# Patient Record
Sex: FEMALE | Race: WHITE | NOT HISPANIC OR LATINO | Employment: STUDENT | URBAN - METROPOLITAN AREA
[De-identification: names, ages, dates, MRNs, and addresses within clinical notes are randomized per-mention and may not be internally consistent; named-entity substitution may affect disease eponyms.]

---
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
## 2021-05-24 ENCOUNTER — MEDICAL CORRESPONDENCE (OUTPATIENT)
Dept: HEALTH INFORMATION MANAGEMENT | Facility: CLINIC | Age: 21
End: 2021-05-24

## 2021-06-09 ENCOUNTER — HOSPITAL ENCOUNTER (OUTPATIENT)
Dept: PHYSICAL THERAPY | Facility: CLINIC | Age: 21
Setting detail: THERAPIES SERIES
End: 2021-06-09
Attending: ORTHOPAEDIC SURGERY
Payer: COMMERCIAL

## 2021-06-09 PROCEDURE — 97033 APP MDLTY 1+IONTPHRSIS EA 15: CPT | Mod: GP | Performed by: PHYSICAL THERAPIST

## 2021-06-09 PROCEDURE — 97162 PT EVAL MOD COMPLEX 30 MIN: CPT | Mod: GP | Performed by: PHYSICAL THERAPIST

## 2021-06-09 PROCEDURE — 97110 THERAPEUTIC EXERCISES: CPT | Mod: GP | Performed by: PHYSICAL THERAPIST

## 2021-06-09 NOTE — PROGRESS NOTES
06/09/21 1100   General Information   Type of Visit Initial OP Ortho PT Evaluation   Start of Care Date 06/09/21   Referring Physician Rah Vance    Patient/Family Goals Statement Eat normal food.    Orders Evaluate and Treat   Orders Comment If no improvement in 4 visits, return to TMJ clinic.    Date of Order 05/24/21   Certification Required? No  (BCBS )   Medical Diagnosis B TMJ Hypermobility   Surgical/Medical history reviewed   (Anxiety, Depr, R Shoulder Reconstructive Surgry 2015)   Precautions/Limitations no known precautions/limitations  (Soft diet, decrease clenching. )   Special Instructions Soft food diet since 5/25/21, with some relief.    General Information Comments Adviil and Tylenol as needed. Sees TMJ specialist in July again.    Body Part(s)   Body Part(s) TMJ   Presentation and Etiology   Pertinent history of current problem (include personal factors and/or comorbidities that impact the POC) Has had clicking since a kid, had dental appt a year ago, and discussed it, saw dentist again in April to get order to pursue TMJ evaluation. Was referred to PT.    Impairments A. Pain;M. Locking or catching   Functional Limitations Other  (Oral Functions )   Symptom Location Pain R TMJ area.    How/Where did it occur From insidious onset   Onset date of current episode/exacerbation 05/24/21  (MD visit, but chronic. )   Chronicity Chronic   Pain rating (0-10 point scale)   (1-7/10 )   Pain quality A. Sharp;C. Aching   Frequency of pain/symptoms A. Constant   Pain/symptoms are: The same all the time   Pain/symptoms exacerbated by M. Other   Pain exacerbation comment Eating    Pain/symptoms eased by H. Cold;I. OTC medication(s)   Progression of symptoms since onset: Improved  (w/change in diet. )   Current / Previous Interventions   Diagnostic Tests: X-ray   X-ray Results Results   X-ray results Unavailable to PT.    Prior Level of Function   Functional Level Prior Comment Independent Currently    Current  Level of Function   Current Community Support Family/friend caregiver   Patient role/employment history A. Employed;B. Student   Employment Comments [employer]. In college for criminal justice.    Living environment [place]   Fall Risk Screen   Fall screen completed by PT   Have you fallen 2 or more times in the past year? No   Have you fallen and had an injury in the past year? No   Timed Up and Go score (seconds) No balance issues and walks quickly into dept.    Is patient a fall risk? No   System Outcome Measures   Outcome Measures   (TMJ Functional Scale Scanned into chart., )   Functional Scales   Functional Scales OPTIMAL   Optimal (1=able to do without difficulty, 2=able to do with little difficulty, 3=able to do with moderate difficulty, 4=able to do with much difficulty, 5=unable to do, 9=NA) Activity 1;Activity 2;Activity 3;Difficulty Score   Activity 1 comment Chewing chicken 7/10    Activity 2 comment Chewing hard bread or tougher food 10/10    Activity 3 comment Yawning 6/10    Difficult score comment 10= SEvere Limitation    TMJ Objective Findings   Integumentary  Equal facial features.    Posture Rounded shoulders.    Joint noises Yes without pain  (WAs getting clicking at end of opening, slips, no pain. )   Joint lock   (After wisdom teeth, could not open or close. 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    Difficulty swallowing No   Muscal Ridging Yes   Tongue Scalloping Yes   Accelerated Tooth Wear No   Overjet (mm) 2   Overbite (mm) 2   Intraoral mouth appliance No  (Might be getting bite guard this summer, )   Bite/Occlusion Bite does not feel off   Major dental work   ([place] teeth within last 2-3 years. )   Tired or painful jaw muscles Yes   Opening click No   Closing click Yes   Crepitus No   Subluxation No   Early translation No   TMJ ROM  Mandible Opening;Mandible Protrusion;Left Laterotrusion;Right Laterotrusion;Deviation with Opening   Palpation Lateral capsule;Superior capsule  (Maseter tender B. )   Accessory Motion Rocabado (0-9)  (9/9 on Rocabado Scale. )   Mandible Opening 35   Mandible Protrusion 7   Left Laterotrusion 12   Right Laterotrusion 12   Deviation with Opening Deviates L during closing.    Lateral capsule Slightly tender    Superior Capsule slightly tender    Planned Therapy Interventions   Planned Therapy Interventions Comment MT, STM, Jt mobility, Retraining, Develop HEP.    Planned Modality Interventions   Planned Modality Interventions Comments Iontophoresis, US,    Clinical Impression   Criteria for Skilled Therapeutic Interventions Met yes, treatment indicated   PT Diagnosis Pain, Hypermobility of TMJ jt.    Influenced by the following impairments Pain, Catching joint.    Functional limitations due to impairments Eating, Yawning,    Clinical Presentation Evolving/Changing   Clinical Presentation Rationale TMJ Functional Scale, TMJ ROM, High Rocabado Score, Mental Health Issues.    Clinical Decision Making (Complexity) Moderate complexity   Therapy Frequency 2 times/Week   Predicted Duration of Therapy Intervention (days/wks) 6 weeks decreasing as appropriate. 10 visits.    Risk & Benefits of therapy have been explained Yes   Patient, Family & other staff in agreement with plan of care Yes   Clinical Impression Comments Pain, Hypermobility of TMJ jt.    Education Assessment   Preferred Learning Style Listening;Demonstration;Pictures/video   Barriers to Learning No barriers   ORTHO GOALS   PT Ortho Eval Goals 1;2;3;4   Ortho Goal 1   Goal Identifier 1   Goal Description STG: Pt will be able to chew bread 4/10 on functional scale.    Target Date 07/22/21   Ortho Goal 2   Goal Identifier 2   Goal Description STG:Pt will be able to open enough to bite into an apple 5/10 on functional scale.    Target Date 07/22/21   Ortho Goal 3    Goal Identifier 3   Goal Description STG: Pt will be able to yawn 3/10 on funcitonal scale.    Target Date 07/22/21   Ortho Goal 4   Goal Identifier 4   Goal Description LTG: Pt will be independent w/HEP and self cares to manage TMJ sx's and problems.    Target Date 07/22/21   Total Evaluation Time   PT Eval, Moderate Complexity Minutes (13704) 35   Maxine Huerta, PT, [facility] (#[id number])  [facility]           [phone]  Fax          [phone]  Appt #      [phone]

## 2021-06-16 ENCOUNTER — HOSPITAL ENCOUNTER (OUTPATIENT)
Dept: PHYSICAL THERAPY | Facility: CLINIC | Age: 21
Setting detail: THERAPIES SERIES
End: 2021-06-16
Attending: ORTHOPAEDIC SURGERY
Payer: COMMERCIAL

## 2021-06-16 PROCEDURE — 97110 THERAPEUTIC EXERCISES: CPT | Mod: GP | Performed by: PHYSICAL THERAPIST

## 2021-06-16 PROCEDURE — 97033 APP MDLTY 1+IONTPHRSIS EA 15: CPT | Mod: GP | Performed by: PHYSICAL THERAPIST

## 2021-06-16 PROCEDURE — 97140 MANUAL THERAPY 1/> REGIONS: CPT | Mod: GP | Performed by: PHYSICAL THERAPIST

## 2021-06-21 ENCOUNTER — HOSPITAL ENCOUNTER (OUTPATIENT)
Dept: PHYSICAL THERAPY | Facility: CLINIC | Age: 21
Setting detail: THERAPIES SERIES
End: 2021-06-21
Attending: ORTHOPAEDIC SURGERY
Payer: COMMERCIAL

## 2021-06-21 PROCEDURE — 97033 APP MDLTY 1+IONTPHRSIS EA 15: CPT | Mod: GP | Performed by: PHYSICAL THERAPIST

## 2021-06-21 PROCEDURE — 97140 MANUAL THERAPY 1/> REGIONS: CPT | Mod: GP | Performed by: PHYSICAL THERAPIST

## 2021-06-23 ENCOUNTER — HOSPITAL ENCOUNTER (OUTPATIENT)
Dept: PHYSICAL THERAPY | Facility: CLINIC | Age: 21
Setting detail: THERAPIES SERIES
End: 2021-06-23
Attending: ORTHOPAEDIC SURGERY
Payer: COMMERCIAL

## 2021-06-23 PROCEDURE — 97033 APP MDLTY 1+IONTPHRSIS EA 15: CPT | Mod: GP | Performed by: PHYSICAL THERAPIST

## 2021-06-23 PROCEDURE — 97140 MANUAL THERAPY 1/> REGIONS: CPT | Mod: GP | Performed by: PHYSICAL THERAPIST

## 2021-06-28 ENCOUNTER — HOSPITAL ENCOUNTER (OUTPATIENT)
Dept: PHYSICAL THERAPY | Facility: CLINIC | Age: 21
Setting detail: THERAPIES SERIES
End: 2021-06-28
Attending: ORTHOPAEDIC SURGERY
Payer: COMMERCIAL

## 2021-06-28 PROCEDURE — 97140 MANUAL THERAPY 1/> REGIONS: CPT | Mod: GP | Performed by: PHYSICAL THERAPIST

## 2021-06-28 PROCEDURE — 97033 APP MDLTY 1+IONTPHRSIS EA 15: CPT | Mod: GP | Performed by: PHYSICAL THERAPIST

## 2021-07-14 ENCOUNTER — HOSPITAL ENCOUNTER (OUTPATIENT)
Dept: PHYSICAL THERAPY | Facility: CLINIC | Age: 21
Setting detail: THERAPIES SERIES
End: 2021-07-14
Attending: ORTHOPAEDIC SURGERY
Payer: COMMERCIAL

## 2021-07-14 PROCEDURE — 97033 APP MDLTY 1+IONTPHRSIS EA 15: CPT | Mod: GP | Performed by: PHYSICAL THERAPIST

## 2021-07-14 PROCEDURE — 97140 MANUAL THERAPY 1/> REGIONS: CPT | Mod: GP | Performed by: PHYSICAL THERAPIST

## 2021-07-21 ENCOUNTER — HOSPITAL ENCOUNTER (OUTPATIENT)
Dept: PHYSICAL THERAPY | Facility: CLINIC | Age: 21
Setting detail: THERAPIES SERIES
End: 2021-07-21
Attending: ORTHOPAEDIC SURGERY
Payer: COMMERCIAL

## 2021-07-21 PROCEDURE — 97035 APP MDLTY 1+ULTRASOUND EA 15: CPT | Mod: GP | Performed by: PHYSICAL THERAPIST

## 2021-07-21 PROCEDURE — 97140 MANUAL THERAPY 1/> REGIONS: CPT | Mod: GP | Performed by: PHYSICAL THERAPIST

## 2021-07-21 NOTE — PROGRESS NOTES
Outpatient Physical Therapy Progress Note     Patient: Danuta Mcclendon  : 2000    Beginning/End Dates of Reporting Period:  21 to 21. Total # of Rx sessions: 7    Referring Provider: Rah Vance Diagnosis: B TMJ Hypermobility.      Client Self Report: MD said to continue PT and start eating a little bit tougher food. Pain Scale: 1/10 when taking it easy.  Chewed steak last weekend and it flared her up.     Objective Measurements:  Objective Measure: TMJ Functional Scale   Details: 21  Scanned into epic.  INITIALLY:  Scanned into epic.     Objective Measure: TMJ ROM  Details: 21  Opening 39, Laterotrusion 10 mm each.  21 Opening 35, Laterotrusion 10 B, Protrusion 9 mm.  INITIALLY:  Opening 35, Protrusioin 7, B Laterotusion 12 mm, Deviates L during closing.     Objective Measure: Spec Tests  Details: 21  No tenderness of masseters today.  INITIALLY:  Rocabado /; Tender B Masseter, Overjet and Overbite 2 mm.      Goals:  Goal Identifier 1   Goal Description STG: Pt will be able to chew bread 4/10 on functional scale. 21 NOT MET    Target Date 21   Date Met      Progress (detail required for progress note):  Today rated 9/10 for chewing hard bread.      Goal Identifier 2   Goal Description STG:Pt will be able to open enough to bite into an apple 5/10 on functional scale.  21 Apple 7/10 and [place] 6/10.  NOT MET    Target Date 21   Date Met      Progress (detail required for progress note):  21 Apple 7/10 and [place] 6/10.     Goal Identifier 3   Goal Description STG: Pt will be able to yawn 3/10 on funcitonal scale.   21  Rated 4/10    Target Date 21   Date Met      Progress (detail required for progress note): Better, but rated 4/10, not 3/10.      Goal Identifier 4   Goal Description LTG: Pt will be independent w/HEP and self cares to manage TMJ sx's and problems.    Target Date 21   Date Met      Progress (detail  required for progress note):     Plan:  Continue therapy per current plan of care.  Changes to therapy plan of care: US vs. Iontophoresis.     Discharge:  No    Maxine Huerta PT, [facility] (#[id number])  [facility]           [phone]  Fax          [phone]  Appt #      [phone]

## 2021-08-02 ENCOUNTER — HOSPITAL ENCOUNTER (OUTPATIENT)
Dept: PHYSICAL THERAPY | Facility: CLINIC | Age: 21
Setting detail: THERAPIES SERIES
End: 2021-08-02
Attending: ORTHOPAEDIC SURGERY
Payer: COMMERCIAL

## 2021-08-02 PROCEDURE — 97035 APP MDLTY 1+ULTRASOUND EA 15: CPT | Mod: GP | Performed by: PHYSICAL THERAPIST

## 2021-08-02 PROCEDURE — 97140 MANUAL THERAPY 1/> REGIONS: CPT | Mod: GP | Performed by: PHYSICAL THERAPIST

## 2021-08-11 ENCOUNTER — HOSPITAL ENCOUNTER (OUTPATIENT)
Dept: PHYSICAL THERAPY | Facility: CLINIC | Age: 21
Setting detail: THERAPIES SERIES
End: 2021-08-11
Attending: ORTHOPAEDIC SURGERY
Payer: COMMERCIAL

## 2021-08-11 PROCEDURE — 97140 MANUAL THERAPY 1/> REGIONS: CPT | Mod: GP | Performed by: PHYSICAL THERAPIST

## 2021-08-11 PROCEDURE — 97035 APP MDLTY 1+ULTRASOUND EA 15: CPT | Mod: GP | Performed by: PHYSICAL THERAPIST

## 2021-08-16 ENCOUNTER — HOSPITAL ENCOUNTER (OUTPATIENT)
Dept: PHYSICAL THERAPY | Facility: CLINIC | Age: 21
Setting detail: THERAPIES SERIES
End: 2021-08-16
Attending: ORTHOPAEDIC SURGERY
Payer: COMMERCIAL

## 2021-08-16 PROCEDURE — 97035 APP MDLTY 1+ULTRASOUND EA 15: CPT | Mod: GP | Performed by: PHYSICAL THERAPIST

## 2021-08-16 PROCEDURE — 97140 MANUAL THERAPY 1/> REGIONS: CPT | Mod: GP | Performed by: PHYSICAL THERAPIST

## 2021-08-16 NOTE — PROGRESS NOTES
Outpatient Physical Therapy Discharge Note     Patient: Danuta Mcclendon  : 2000    Beginning/End Dates of Reporting Period:  21 to 21. Total # of Rx sessions: 10    Referring Provider: Rah Vance Diagnosis:  B TMJ Hypermobility      Client Self Report: Last appointment. Returning to college. Tried eating tougher food and it seemed to flare it up. Pain Scale: 4/10 average. Get clicking w/ yawning wide.     Objective Measurements:  Objective Measure: TMJ Functional Scale   Details: 21  Scanned into epic.  INITIALLY:  Scanned into epic.     Objective Measure: TMJ ROM  Details: 21  Opening 41, Laterotrusio  10 mm.  21  Opening 39, Laterotrusion 10 mm each.  21 Opening 35, Laterotrusion 10 B, Protrusion 9 mm.  INITIALLY:  Opening 35, Protrusioin 7, B Laterotusion 12 mm, Deviates L during closing.     Objective Measure: Spec Tests  Details: 21  No tenderness of masseters today.  INITIALLY:  Rocabado ; Tender B Masseter, Overjet and Overbite 2 mm.      Goals:  Goal Identifier 1   Goal Description STG: Pt will be able to chew bread 4/10 on functional scale. 21 NOT MET    Target Date 21   Date Met      Progress (detail required for progress note):     Goal Identifier 2   Goal Description STG:Pt will be able to open enough to bite into an apple 5/10 on functional scale.  MET     Target Date 21   Date Met  21   Progress (detail required for progress note):     Goal Identifier 3   Goal Description STG: Pt will be able to yawn 3/10 on funcitonal scale.   21  Rated 4/10    Target Date 21   Date Met      Progress (detail required for progress note):     Goal Identifier 4   Goal Description LTG: Pt will be independent w/HEP and self cares to manage TMJ sx's and problems.  MET    Target Date 21   Date Met  21   Progress (detail required for progress note):     Plan:  Discharge from therapy.    Discharge:  Yes     Reason for  Discharge: Patient chooses to discontinue therapy.  Will be leaving for college this week.     Equipment Issued:      Discharge Plan: Patient to continue home program.  Pt to follow up w/MD as appropriate.   Maxine Huerta, PT, [facility] (#[id number])  [facility]           [phone]  Fax          [phone]  Appt #      [phone]

## 2021-10-03 ENCOUNTER — HEALTH MAINTENANCE LETTER (OUTPATIENT)
Age: 21
End: 2021-10-03

## 2022-01-23 ENCOUNTER — HEALTH MAINTENANCE LETTER (OUTPATIENT)
Age: 22
End: 2022-01-23

## 2022-09-10 ENCOUNTER — HEALTH MAINTENANCE LETTER (OUTPATIENT)
Age: 22
End: 2022-09-10

## 2022-10-14 NOTE — TELEPHONE ENCOUNTER
Pt was seen by MD Amy on 5/24/17.  Need new order for Minocycline, taking med for Acne.   Dr. Chambers has retired.  Pt has been on med since 2/2017.   Advise.Judy     Imaging Studies/Medications

## 2023-04-30 ENCOUNTER — HEALTH MAINTENANCE LETTER (OUTPATIENT)
Age: 23
End: 2023-04-30

## 2024-07-07 ENCOUNTER — HEALTH MAINTENANCE LETTER (OUTPATIENT)
Age: 24
End: 2024-07-07